# Patient Record
Sex: FEMALE | Race: ASIAN | NOT HISPANIC OR LATINO | Employment: FULL TIME | URBAN - NONMETROPOLITAN AREA
[De-identification: names, ages, dates, MRNs, and addresses within clinical notes are randomized per-mention and may not be internally consistent; named-entity substitution may affect disease eponyms.]

---

## 2024-01-05 ENCOUNTER — TELEPHONE (OUTPATIENT)
Dept: CARDIOLOGY | Facility: CLINIC | Age: 64
End: 2024-01-05

## 2024-01-05 NOTE — TELEPHONE ENCOUNTER
1st attempt to contact patient to schedule referral appt - Left voicemail.   Rosa Abraham, baseline 50s, drops into 30s w/ sleep   Per Sally : WPM 1/16 @ 10:00

## 2024-01-11 ENCOUNTER — OFFICE VISIT (OUTPATIENT)
Dept: CARDIOLOGY | Facility: CLINIC | Age: 64
End: 2024-01-11
Payer: COMMERCIAL

## 2024-01-11 VITALS
DIASTOLIC BLOOD PRESSURE: 94 MMHG | SYSTOLIC BLOOD PRESSURE: 120 MMHG | HEART RATE: 53 BPM | HEIGHT: 68 IN | WEIGHT: 192 LBS | BODY MASS INDEX: 29.1 KG/M2

## 2024-01-11 DIAGNOSIS — R94.31 ABNORMAL EKG: ICD-10-CM

## 2024-01-11 DIAGNOSIS — R42 DIZZINESS: Primary | ICD-10-CM

## 2024-01-11 DIAGNOSIS — E66.3 OVERWEIGHT WITH BODY MASS INDEX (BMI) OF 29 TO 29.9 IN ADULT: ICD-10-CM

## 2024-01-11 DIAGNOSIS — R00.1 BRADYCARDIA BY ELECTROCARDIOGRAM: ICD-10-CM

## 2024-01-11 PROCEDURE — 93000 ELECTROCARDIOGRAM COMPLETE: CPT | Performed by: INTERNAL MEDICINE

## 2024-01-11 PROCEDURE — 99204 OFFICE O/P NEW MOD 45 MIN: CPT | Performed by: INTERNAL MEDICINE

## 2024-01-11 PROCEDURE — 1036F TOBACCO NON-USER: CPT | Performed by: INTERNAL MEDICINE

## 2024-01-11 PROCEDURE — 3008F BODY MASS INDEX DOCD: CPT | Performed by: INTERNAL MEDICINE

## 2024-01-11 RX ORDER — TRAMADOL HYDROCHLORIDE 50 MG/1
50 TABLET ORAL EVERY 4 HOURS PRN
COMMUNITY
End: 2024-01-11 | Stop reason: ALTCHOICE

## 2024-01-11 NOTE — PROGRESS NOTES
"Cardiology Consultation- New Consult    Reason for referral: ***    HPI: Marie Marinelli is a 63 y.o. female       Past Medical History:   She has a past medical history of Abnormal ECG (24).    Surgical History:   She has a past surgical history that includes Cataract extraction (Bilateral);  section, classic; Hysterectomy; Gallbladder surgery; Shoulder biopsy; and Colonoscopy.    Family History:   Family History   Problem Relation Name Age of Onset    Heart attack Father Tee        Social History:   Social History     Tobacco Use    Smoking status: Never    Smokeless tobacco: Never   Substance Use Topics    Alcohol use: Never        Allergies:  Patient has no known allergies.     Current Medications:    Current Outpatient Medications:     traMADol (Ultram) 50 mg tablet, Take 1 tablet (50 mg) by mouth every 4 hours if needed for severe pain (7 - 10) (as needed)., Disp: , Rfl:      Vitals:  Visit Vitals  /86 (BP Location: Left arm, Patient Position: Sitting)   Pulse 65   Ht 1.6 m (5' 3\")   Wt 70.8 kg (156 lb)   BMI 27.63 kg/m²   Smoking Status Never   BSA 1.77 m²   EKG done in office today        Review of Systems   All other systems reviewed and are negative.      Objective         Physical Exam           Assessment and Plan:   No diagnosis found.                   "

## 2024-01-11 NOTE — LETTER
January 11, 2024     Marimar Lee DO  [address] [facility]. [place] [address]    Patient: Marie Marinelli   YOB: 1960   Date of Visit: 1/11/2024       Dear Dr. Marimar Lee DO:    Thank you for referring Marie Marinelli to me for evaluation. Below are my notes for this consultation.  If you have questions, please do not hesitate to call me. I look forward to following your patient along with you.       Sincerely,     Abhijit Angulo MD      CC: No Recipients  ______________________________________________________________________________________

## 2024-01-11 NOTE — PROGRESS NOTES
Cardiology Consultation- New Consult    Reason for referral: Bradycardia     HPI: Marie Marinelli is a 63 y.o. female who is from [facility] came with her son who is a nurse, the patient does not have good command of English.  Her son tells me that last week she had an episode of dizziness in the morning and her Apple Watch indicated a heart rate in the 40s.  He saw her PCP Dr. Marimar Lee and was referred to me for further evaluation, there has been no further events since that event.  He noticed that at night her heart rate drops in the 40s as well.  She had no further events since that dizzy spell.  There is no history of syncope or near syncope and no dyspnea on exertion or chest pain no weight loss or weight gain.  She has had no gastrointestinal or genitourinary symptoms.  We do not have information about her family history.  She is currently not on medication that can affect the heart rate.  She does not have any medical problem that is being treated at this time.  Review of system otherwise was normal physical examination was normal except for her weight and her EKG revealed sinus bradycardia.  All the intervals otherwise were normal.    Assessment/recommendations:    1-sinus bradycardia which is probably due to sinus node dysfunction which is presently inconsequential and unlikely to be causing the patient's symptoms of dizziness.  To be on the safe side the patient will be undergoing 48-hour Holter monitor and an echocardiogram.  I expect no significant arrhythmias and if all come back normal no further cardiac testing will be provided and reassurances will be all what is needed.  2-overweight, the patient has made an effort to lose weight and has done so well.      Past Medical History:   She has a past medical history of Abnormal ECG (1/2/24).    Surgical History:   She has a past surgical history that includes No past surgeries.    Family History:   Family History   Problem Relation Name Age of Onset     "Aneurysm Mother      Heart attack Father Tee        Social History:   Social History     Tobacco Use    Smoking status: Never    Smokeless tobacco: Never   Substance Use Topics    Alcohol use: Never        Allergies:  Patient has no known allergies.     Current Medications:  No current outpatient medications on file.     Vitals:  Visit Vitals  BP (!) 120/94 (BP Location: Right arm, Patient Position: Sitting)   Pulse 53   Ht 1.727 m (5' 8\")   Wt 87.1 kg (192 lb)   BMI 29.19 kg/m²   Smoking Status Never   BSA 2.04 m²     EKG done in office today        Review of Systems   All other systems reviewed and are negative.      Objective         Physical Exam  Constitutional:       Appearance: Normal appearance. She is normal weight.   HENT:      Nose: Nose normal.   Neck:      Vascular: No carotid bruit.   Cardiovascular:      Rate and Rhythm: Normal rate.      Pulses: Normal pulses.      Heart sounds: Normal heart sounds.   Pulmonary:      Effort: Pulmonary effort is normal.   Abdominal:      General: Bowel sounds are normal.      Palpations: Abdomen is soft.   Genitourinary:     Rectum: Normal.   Musculoskeletal:         General: Normal range of motion.      Cervical back: Normal range of motion.      Right lower leg: No edema.      Left lower leg: No edema.   Skin:     General: Skin is warm and dry.   Neurological:      General: No focal deficit present.      Mental Status: She is alert.   Psychiatric:         Mood and Affect: Mood normal.         Behavior: Behavior normal.         Thought Content: Thought content normal.         Judgment: Judgment normal.                Assessment and Plan:   1. Bradycardia by electrocardiogram        2. Abnormal EKG        3. Overweight with body mass index (BMI) of 29 to 29.9 in adult                   Scribe Attestation  By signing my name below, Naomie BOYD LPN   , Scribe   attest that this documentation has been prepared under the direction and in the presence of Abhijit Angulo, " MD.

## 2024-01-16 ENCOUNTER — APPOINTMENT (OUTPATIENT)
Dept: CARDIOLOGY | Facility: CLINIC | Age: 64
End: 2024-01-16
Payer: COMMERCIAL

## 2024-01-29 ENCOUNTER — ANCILLARY PROCEDURE (OUTPATIENT)
Dept: CARDIOLOGY | Facility: CLINIC | Age: 64
End: 2024-01-29
Payer: COMMERCIAL

## 2024-01-29 ENCOUNTER — HOSPITAL ENCOUNTER (OUTPATIENT)
Dept: CARDIOLOGY | Facility: CLINIC | Age: 64
Discharge: HOME | End: 2024-01-29
Payer: COMMERCIAL

## 2024-01-29 VITALS
DIASTOLIC BLOOD PRESSURE: 92 MMHG | SYSTOLIC BLOOD PRESSURE: 126 MMHG | WEIGHT: 192 LBS | HEIGHT: 68 IN | BODY MASS INDEX: 29.1 KG/M2

## 2024-01-29 DIAGNOSIS — R94.31 ABNORMAL EKG: ICD-10-CM

## 2024-01-29 DIAGNOSIS — R00.1 BRADYCARDIA BY ELECTROCARDIOGRAM: ICD-10-CM

## 2024-01-29 PROCEDURE — 93227 XTRNL ECG REC<48 HR R&I: CPT | Performed by: INTERNAL MEDICINE

## 2024-01-29 PROCEDURE — 93306 TTE W/DOPPLER COMPLETE: CPT | Performed by: INTERNAL MEDICINE

## 2024-01-29 PROCEDURE — 93225 XTRNL ECG REC<48 HRS REC: CPT | Performed by: INTERNAL MEDICINE

## 2024-01-29 PROCEDURE — 93306 TTE W/DOPPLER COMPLETE: CPT

## 2024-01-30 LAB
AORTIC VALVE MEAN GRADIENT: 3 MMHG
AORTIC VALVE PEAK VELOCITY: 1.32 M/S
AV PEAK GRADIENT: 7 MMHG
AVA (PEAK VEL): 2.02 CM2
AVA (VTI): 2.21 CM2
EJECTION FRACTION APICAL 4 CHAMBER: 66.3
LEFT VENTRICLE INTERNAL DIMENSION DIASTOLE: 5 CM (ref 3.5–6)
LEFT VENTRICULAR OUTFLOW TRACT DIAMETER: 2 CM
MITRAL VALVE E/A RATIO: 0.57
MITRAL VALVE E/E' RATIO: 5.9
RIGHT VENTRICLE PEAK SYSTOLIC PRESSURE: 23.1 MMHG

## 2024-01-31 ENCOUNTER — TELEPHONE (OUTPATIENT)
Dept: CARDIOLOGY | Facility: CLINIC | Age: 64
End: 2024-01-31
Payer: COMMERCIAL

## 2024-01-31 NOTE — TELEPHONE ENCOUNTER
----- Message from Abhijit Angulo MD sent at 1/31/2024  8:32 AM EST -----  Let her know the echocardiogram showed no indication of previous heart attack so EKG is nothing to only about  ----- Message -----  From: Sonali, Syngo - Cardiology Results In  Sent: 1/30/2024   5:41 PM EST  To: Abhijit Angulo MD

## 2024-02-12 LAB — BODY SURFACE AREA: 2.04 M2

## 2024-02-13 ENCOUNTER — TELEPHONE (OUTPATIENT)
Dept: CARDIOLOGY | Facility: CLINIC | Age: 64
End: 2024-02-13
Payer: COMMERCIAL

## 2024-02-13 NOTE — TELEPHONE ENCOUNTER
----- Message from Abhijit Angulo MD sent at 2/12/2024  5:29 PM EST -----  Let her know that both her echocardiogram and Holter monitors did not demonstrate any significant abnormalities.  No further cardiac testing is recommended  ----- Message -----  From: Abhijit Angulo MD  Sent: 2/12/2024   5:28 PM EST  To: Abhijit Angulo MD